# Patient Record
(demographics unavailable — no encounter records)

---

## 2025-02-25 NOTE — HISTORY OF PRESENT ILLNESS
[9] : 9 [8] : 8 [Dull/Aching] : dull/aching [Localized] : localized [Sharp] : sharp [Throbbing] : throbbing [Student] : Work status: student [de-identified] :  02/25/2025: PHILLIP JAEGER , a RHD 19 year old female, presents today for Np right shoulder, elbow and wrist pain, injured 2/23/25 when she twisted wrist to catch a computer falling off a desk. No treatment at this time. Pt denies numbness/tingling.  PMH: Hashimoto's Allergies: NKDA

## 2025-02-25 NOTE — IMAGING
[de-identified] : Right shoulder with full ROM TTP over posterior subacromial region strength is 4/5 in all planes due to gaurding Posterior Lift Off is mildly painful Impingement Signs are mildly positive Speed Sign is equivocal Yergason Sign is negative. RUE is nvi Right elbow with full ROM TTP over medial epicondylar region and flexor mass with no palpable deformity right elbow strength is 5/5 in all planes Wrist flexion against resistance produces only wrist pain +TFCC Grind with no ttp over the TFCC region Li Test is painful with no instability / TTP SL region / no ttp over snuffbox Finkelstein Test is negative  and intrinsic strength is 5/5  2/25/2025: Right shoulder 2 view xray: no acute bony pathology  Right elbow 3 view xray: no acute bony pathology  Right wrist 3 view xray: no acute bony pathology

## 2025-02-25 NOTE — ASSESSMENT
[FreeTextEntry1] : The patient was advised of the diagnosis. The natural history of the pathology was explained in full to the patient in layman's terms. All questions were answered. The risks and benefits of surgical and non-surgical treatment alternatives were explained in full to the patient.  Provided right wrist splint for comfort x 2-3 weeks. Right shoulder / elbow rest x 2 weeks Naprosyn 550mg bid x 10 days  Pt will rto in 3-4 weeks for f/u care.  NSAIDs recommended.  Patient warned of risk of NSAID medication to stomach and GI tract, risk of increase blood pressure, cardiac risk, and risk of fluid retention.  The patient should clear taking medication with internist/PMD if any problem with heart, blood pressure, or GI system exists.

## 2025-03-07 NOTE — HISTORY OF PRESENT ILLNESS
[Pain Location] : pain [C-Spine] : C-spine region [Lumbar] : lumbar region [Worsening] : worsening [4] : a current pain level of 4/10 [Bending] : worsened by bending [Lifting] : worsened by lifting [NSAIDs] : relieved by nonsteroidal anti-inflammatory drugs [de-identified] : Patient is a 19-year-old female here to discuss of neck pain radiating into her head and bilateral trapezoids.  No radiculopathy into the upper extremities and hands. In 2023 patient was at a camp was playing a basketball game and was pulled and dragged by another player. Patient denies of head trauma.  Has been taking Advil as needed with some mild relief. Patient brings in MRI of the cervical spine and x-rays of the lumbar spine that was ordered by another orthopedist.

## 2025-03-07 NOTE — PHYSICAL EXAM
[ALL] : Biceps, brachioradialis, triceps, patellar, ankle and plantar 2+ and symmetric bilaterally [Normal] : No costovertebral angle tenderness and no spinal tenderness [LE] : Sensory: Intact in bilateral lower extremities [de-identified] : Full range of motion of the upper extremities with no elicited pain in the shoulders but increasing pain in the musculature of the neck and into the occipital area [de-identified] : Heel and toe walk is intact. Tension signs of bilateral LEs are negative. [de-identified] : X-rays that were obtained by another facility demonstrating a very mild scoliosis.  Upon review with the other orthopedist it was less than 20 degrees of curvature. MRI of the cervical spine done on February 7, 2025 with [facility] and [facility] 1.  Reversal of the normal cervical lordosis which can be seen with muscle spasms. 2.  Small disc herniations without central canal stenosis  MRI of the thoracic spine noncontrast done on February 7, 2025 with [facility] Ernandez Degenerative changes with very shallow disc herniations but no significant spinal canal stenosis or nerve root compression.  MRI of the lumbar spine done on February 7, 2025 noncontrast with Mahad Ernandez There is a sacralized L5 vertebral body with a rudimentary L5-S1 disc Vertebral body heights are maintained Alignment: Mild scoliosis at the thoracolumbar junction. Spinal canal: The conus medullaris and cauda equina are unremarkable

## 2025-03-07 NOTE — HISTORY OF PRESENT ILLNESS
[Pain Location] : pain [C-Spine] : C-spine region [Lumbar] : lumbar region [Worsening] : worsening [4] : a current pain level of 4/10 [Bending] : worsened by bending [Lifting] : worsened by lifting [NSAIDs] : relieved by nonsteroidal anti-inflammatory drugs [de-identified] : Patient is a 19-year-old female here to discuss of neck pain radiating into her head and bilateral trapezoids.  No radiculopathy into the upper extremities and hands. In 2023 patient was at a camp was playing a basketball game and was pulled and dragged by another player. Patient denies of head trauma.  Has been taking Advil as needed with some mild relief. Patient brings in MRI of the cervical spine and x-rays of the lumbar spine that was ordered by another orthopedist.

## 2025-03-07 NOTE — DISCUSSION/SUMMARY
[Medication Risks Reviewed] : Medication risks reviewed [Surgical risks reviewed] : Surgical risks reviewed [PRN] : PRN [de-identified] : Patient is to maintain better posture of the thoracic spine and cervical spine, physical therapy is encouraged otherwise maintaining an increased activity and less sedentary lifestyle can help prevent the progression of her symptoms as well as the findings on the MRIs.  I, Dr. Trace Sibley, personally performed the evaluation and management (E/M) services for this new patient.  That E/M includes conducting the initial examination, assessing all conditions, and establishing a plan of care.  Today, my ACP, Alice Branch, was here to observe my evaluation and management services for this patient to be followed going forward.

## 2025-03-07 NOTE — PHYSICAL EXAM
[ALL] : Biceps, brachioradialis, triceps, patellar, ankle and plantar 2+ and symmetric bilaterally [Normal] : No costovertebral angle tenderness and no spinal tenderness [LE] : Sensory: Intact in bilateral lower extremities [de-identified] : Full range of motion of the upper extremities with no elicited pain in the shoulders but increasing pain in the musculature of the neck and into the occipital area [de-identified] : Heel and toe walk is intact. Tension signs of bilateral LEs are negative. [de-identified] : X-rays that were obtained by another facility demonstrating a very mild scoliosis.  Upon review with the other orthopedist it was less than 20 degrees of curvature. MRI of the cervical spine done on February 7, 2025 with [facility] and [facility] 1.  Reversal of the normal cervical lordosis which can be seen with muscle spasms. 2.  Small disc herniations without central canal stenosis  MRI of the thoracic spine noncontrast done on February 7, 2025 with [facility] Ernandez Degenerative changes with very shallow disc herniations but no significant spinal canal stenosis or nerve root compression.  MRI of the lumbar spine done on February 7, 2025 noncontrast with Mahad Ernandez There is a sacralized L5 vertebral body with a rudimentary L5-S1 disc Vertebral body heights are maintained Alignment: Mild scoliosis at the thoracolumbar junction. Spinal canal: The conus medullaris and cauda equina are unremarkable

## 2025-03-07 NOTE — DISCUSSION/SUMMARY
[Medication Risks Reviewed] : Medication risks reviewed [Surgical risks reviewed] : Surgical risks reviewed [PRN] : PRN [de-identified] : Patient is to maintain better posture of the thoracic spine and cervical spine, physical therapy is encouraged otherwise maintaining an increased activity and less sedentary lifestyle can help prevent the progression of her symptoms as well as the findings on the MRIs.  I, Dr. Trace Sibley, personally performed the evaluation and management (E/M) services for this new patient.  That E/M includes conducting the initial examination, assessing all conditions, and establishing a plan of care.  Today, my ACP, Alice Branch, was here to observe my evaluation and management services for this patient to be followed going forward.